# Patient Record
Sex: MALE | Race: BLACK OR AFRICAN AMERICAN | NOT HISPANIC OR LATINO | URBAN - METROPOLITAN AREA
[De-identification: names, ages, dates, MRNs, and addresses within clinical notes are randomized per-mention and may not be internally consistent; named-entity substitution may affect disease eponyms.]

---

## 2017-01-25 ENCOUNTER — EMERGENCY (EMERGENCY)
Facility: HOSPITAL | Age: 62
LOS: 1 days | Discharge: HOME | End: 2017-01-25
Attending: EMERGENCY MEDICINE | Admitting: EMERGENCY MEDICINE
Payer: COMMERCIAL

## 2017-01-25 VITALS
HEIGHT: 72 IN | OXYGEN SATURATION: 100 % | TEMPERATURE: 98 F | SYSTOLIC BLOOD PRESSURE: 113 MMHG | DIASTOLIC BLOOD PRESSURE: 69 MMHG | RESPIRATION RATE: 17 BRPM | HEART RATE: 74 BPM | WEIGHT: 179.9 LBS

## 2017-01-25 DIAGNOSIS — M25.511 PAIN IN RIGHT SHOULDER: ICD-10-CM

## 2017-01-25 DIAGNOSIS — M54.5 LOW BACK PAIN: ICD-10-CM
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
## 2017-01-25 DIAGNOSIS — Z98.89 OTHER SPECIFIED POSTPROCEDURAL STATES: Chronic | ICD-10-CM
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
## 2017-01-25 DIAGNOSIS — L02.91 CUTANEOUS ABSCESS, UNSPECIFIED: Chronic | ICD-10-CM
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
## 2017-01-25 DIAGNOSIS — M54.41 LUMBAGO WITH SCIATICA, RIGHT SIDE: ICD-10-CM

## 2017-01-25 PROCEDURE — 99284 EMERGENCY DEPT VISIT MOD MDM: CPT | Mod: 25

## 2017-01-25 PROCEDURE — 99053 MED SERV 10PM-8AM 24 HR FAC: CPT

## 2017-01-25 NOTE — ED ADULT TRIAGE NOTE - CHIEF COMPLAINT QUOTE
as per pt he has been having LBP and r shoulder . PT stated THAT he was bending picking up something yesterday and is now having the back pain had back sx in April od last year

## 2017-01-26 VITALS
TEMPERATURE: 98 F | HEART RATE: 63 BPM | OXYGEN SATURATION: 98 % | RESPIRATION RATE: 18 BRPM | SYSTOLIC BLOOD PRESSURE: 121 MMHG | DIASTOLIC BLOOD PRESSURE: 86 MMHG

## 2017-01-26 PROCEDURE — 73030 X-RAY EXAM OF SHOULDER: CPT

## 2017-01-26 PROCEDURE — 96372 THER/PROPH/DIAG INJ SC/IM: CPT

## 2017-01-26 PROCEDURE — 73030 X-RAY EXAM OF SHOULDER: CPT | Mod: 26,RT

## 2017-01-26 PROCEDURE — 99283 EMERGENCY DEPT VISIT LOW MDM: CPT | Mod: 25

## 2017-01-26 RX ORDER — KETOROLAC TROMETHAMINE 30 MG/ML
60 SYRINGE (ML) INJECTION ONCE
Qty: 0 | Refills: 0 | Status: DISCONTINUED | OUTPATIENT
Start: 2017-01-26 | End: 2017-01-26

## 2017-01-26 RX ORDER — TRAMADOL HYDROCHLORIDE 50 MG/1
50 TABLET ORAL ONCE
Qty: 0 | Refills: 0 | Status: DISCONTINUED | OUTPATIENT
Start: 2017-01-26 | End: 2017-01-26

## 2017-01-26 RX ADMIN — TRAMADOL HYDROCHLORIDE 50 MILLIGRAM(S): 50 TABLET ORAL at 01:33

## 2017-01-26 RX ADMIN — Medication 60 MILLIGRAM(S): at 00:51

## 2017-01-26 RX ADMIN — Medication 60 MILLIGRAM(S): at 00:40

## 2017-01-26 NOTE — ED PROVIDER NOTE - ATTENDING CONTRIBUTION TO CARE
61M c/o lower back pain and R shoulder pain. 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  hx of disc a year ago. no numbness/weakness. no incontinence. pain worse with ambulation  gen- nad  heent- ncat, clear conj  cv -rrr  lungs -ctab  abd - soft, nt, nd  ext -wwp, no edema  neuro -aox3, steady gait, del cid  lower back pain, no evidence of cord compression, given toradol, recommend PMD f/u

## 2017-01-26 NOTE — ED PROVIDER NOTE - OBJECTIVE STATEMENT
low back pain and right shoulder pain + reports Lami/disc surgery < 1 year ago with good results low back pain and right shoulder pain + reports Lami/disc surgery < 1 year ago with good results though intermittent back aching and right shoulder pain ( unknown if related ) States stress lower back yesterday at work + [employer] employee

## 2017-01-26 NOTE — ED PROVIDER NOTE - PHYSICAL EXAMINATION
mobilizes LE but tender with ROM RLE MS quads / TA/ DF/PF /EHL 5/5 compartments soft pulses 2+ sensation intact LT
